# Patient Record
Sex: FEMALE | Race: WHITE | NOT HISPANIC OR LATINO | Employment: UNEMPLOYED | URBAN - METROPOLITAN AREA
[De-identification: names, ages, dates, MRNs, and addresses within clinical notes are randomized per-mention and may not be internally consistent; named-entity substitution may affect disease eponyms.]

---

## 2022-06-01 ENCOUNTER — OFFICE VISIT (OUTPATIENT)
Dept: PODIATRY | Facility: CLINIC | Age: 47
End: 2022-06-01
Payer: COMMERCIAL

## 2022-06-01 VITALS
HEART RATE: 71 BPM | SYSTOLIC BLOOD PRESSURE: 124 MMHG | WEIGHT: 231.4 LBS | HEIGHT: 66 IN | DIASTOLIC BLOOD PRESSURE: 84 MMHG | BODY MASS INDEX: 37.19 KG/M2

## 2022-06-01 DIAGNOSIS — M79.671 PAIN OF RIGHT FOOT: ICD-10-CM

## 2022-06-01 DIAGNOSIS — M77.31 CALCANEAL SPUR, RIGHT: ICD-10-CM

## 2022-06-01 DIAGNOSIS — M24.571 EQUINUS CONTRACTURE OF RIGHT ANKLE: ICD-10-CM

## 2022-06-01 DIAGNOSIS — M72.2 PLANTAR FASCIITIS: Primary | ICD-10-CM

## 2022-06-01 PROCEDURE — 99203 OFFICE O/P NEW LOW 30 MIN: CPT | Performed by: PODIATRIST

## 2022-06-01 PROCEDURE — 20550 NJX 1 TENDON SHEATH/LIGAMENT: CPT | Performed by: PODIATRIST

## 2022-06-01 RX ORDER — MELOXICAM 15 MG/1
15 TABLET ORAL DAILY
COMMUNITY
Start: 2022-05-11 | End: 2022-06-01 | Stop reason: ALTCHOICE

## 2022-06-01 RX ORDER — TRIAMCINOLONE ACETONIDE 40 MG/ML
20 INJECTION, SUSPENSION INTRA-ARTICULAR; INTRAMUSCULAR ONCE
Status: COMPLETED | OUTPATIENT
Start: 2022-06-01 | End: 2022-06-01

## 2022-06-01 RX ORDER — MELOXICAM 15 MG/1
15 TABLET ORAL DAILY
Qty: 30 TABLET | Refills: 0 | Status: SHIPPED | OUTPATIENT
Start: 2022-06-01 | End: 2022-06-27 | Stop reason: ALTCHOICE

## 2022-06-01 RX ORDER — HYDROXYZINE HYDROCHLORIDE 10 MG/1
10 TABLET, FILM COATED ORAL 3 TIMES DAILY PRN
COMMUNITY
Start: 2022-05-06

## 2022-06-01 RX ORDER — SERTRALINE HYDROCHLORIDE 100 MG/1
100 TABLET, FILM COATED ORAL DAILY
COMMUNITY
Start: 2022-05-18

## 2022-06-01 RX ORDER — LIDOCAINE HYDROCHLORIDE 10 MG/ML
2 INJECTION, SOLUTION EPIDURAL; INFILTRATION; INTRACAUDAL; PERINEURAL ONCE
Status: COMPLETED | OUTPATIENT
Start: 2022-06-01 | End: 2022-06-01

## 2022-06-01 RX ORDER — SPIRONOLACTONE 100 MG/1
100 TABLET, FILM COATED ORAL DAILY
COMMUNITY
Start: 2022-05-24

## 2022-06-01 RX ORDER — GUSELKUMAB 100 MG/ML
INJECTION SUBCUTANEOUS
COMMUNITY
Start: 2022-05-18

## 2022-06-01 RX ADMIN — LIDOCAINE HYDROCHLORIDE 2 ML: 10 INJECTION, SOLUTION EPIDURAL; INFILTRATION; INTRACAUDAL; PERINEURAL at 09:23

## 2022-06-01 RX ADMIN — TRIAMCINOLONE ACETONIDE 20 MG: 40 INJECTION, SUSPENSION INTRA-ARTICULAR; INTRAMUSCULAR at 09:23

## 2022-06-01 NOTE — LETTER
June 1, 2022     VICENTA Hawthorne  [address] [place] , [facility], [place]  [address]     Patient: Jose Santos   YOB: 1975   Date of Visit: 6/1/2022       Dear Dr Good:    Thank you for referring Jose Santos to me for evaluation  Below are my notes for this consultation  If you have questions, please do not hesitate to call me  I look forward to following your patient along with you  Sincerely,        Shama Lambert DPM        CC: No Recipients  Kyrie Sutton  6/1/2022  9:41 AM  Sign when Signing Visit                 PATIENT:  Jose Santos  1975         ASSESSMENT:     1  Plantar fasciitis  lidocaine (PF) (XYLOCAINE-MPF) 1 % injection 2 mL    triamcinolone acetonide (KENALOG-40) 40 mg/mL injection 20 mg    meloxicam (MOBIC) 15 mg tablet    Foot injection   2  Calcaneal spur, right     3  Equinus contracture of right ankle     4  Pain of right foot               PLAN:  1  Reviewed medical records  Patient was counseled and educated on the condition and the diagnosis  2  The diagnosis, treatment options and prognosis were discussed with the patient  3  Her symptoms and exam are consistent with plantar fasciitis  4  Treatment options were discussed and the patient wished to proceed with an injection  Injection was given as in the procedure  5  Instructed supportive care, home exercise, icing, and proper footwear/ arch support  6  Renewed meloxicam     7  Patient will return in 6 weeks for re-evaluation  Foot injection     Date/Time 6/1/2022 9:38 AM     Performed by  Shama Lambert DPM     Authorized by Shama Lambert DPM      Universal Protocol   Consent: Verbal consent obtained  Risks and benefits: risks, benefits and alternatives were discussed  Consent given by: patient  Time out: Immediately prior to procedure a "time out" was called to verify the correct patient, procedure, equipment, support staff and site/side marked as required    Timeout called at: 6/1/2022 9:38 AM   Patient understanding: patient states understanding of the procedure being performed  Site marked: the operative site was marked  Patient identity confirmed: verbally with patient       Procedure Details   Procedure Notes:   Treatment options were discussed and the patient wished to proceed with an injection  A trigger point injection was given to right heel using 0 5cc Kenolog 40 and 2cc 1% Lidocaine pl  Instructed supportive care, icing, and resting  Patient tolerance: Patient tolerated the procedure well with no immediate complications               Subjective:       HPI  The patient was referred to my office for right heel pain  She had it since January  She was sent for X-ray and it showed spur  The symptoms presents around right plantar heel with sharp sensation  Pain level is about 9 out of 10  The symptoms have been worse since the onset  The patient has more pain in the morning and after sitting for a while  Pain also increases with walking and standing  The patient does not recall any injury, but reported some overuse  The patient tried meloxicam, night splint, and different shoes without relieving the pain  Denied any swelling  No associated numbness or paresthesia  No significant weakness  The following portions of the patient's history were reviewed and updated as appropriate: allergies, current medications, past family history, past medical history, past social history, past surgical history and problem list   All pertinent labs and images were reviewed  Past Medical History  History reviewed  No pertinent past medical history  Past Surgical History  Past Surgical History:   Procedure Laterality Date    WRIST SURGERY Right         Allergies:  Patient has no known allergies      Medications:  Current Outpatient Medications   Medication Sig Dispense Refill    hydrOXYzine HCL (ATARAX) 10 mg tablet Take 10 mg by mouth 3 (three) times a day as needed  meloxicam (MOBIC) 15 mg tablet Take 1 tablet (15 mg total) by mouth daily after meal 30 tablet 0    sertraline (ZOLOFT) 100 mg tablet Take 100 mg by mouth daily      spironolactone (ALDACTONE) 100 mg tablet Take 100 mg by mouth daily With food      Tremfya 100 MG/ML SOSY        No current facility-administered medications for this visit  Social History:  Social History     Socioeconomic History    Marital status: /Civil Union     Spouse name: None    Number of children: None    Years of education: None    Highest education level: None   Occupational History    None   Tobacco Use    Smoking status: Former Smoker    Smokeless tobacco: Never Used   Vaping Use    Vaping Use: Never used   Substance and Sexual Activity    Alcohol use: None    Drug use: None    Sexual activity: None   Other Topics Concern    None   Social History Narrative    None     Social Determinants of Health     Financial Resource Strain: Not on file   Food Insecurity: Not on file   Transportation Needs: Not on file   Physical Activity: Not on file   Stress: Not on file   Social Connections: Not on file   Intimate Partner Violence: Not on file   Housing Stability: Not on file          Review of Systems   Constitutional: Negative for appetite change, chills and fever  HENT: Negative for sore throat  Respiratory: Negative for cough and shortness of breath  Cardiovascular: Negative for chest pain  Gastrointestinal: Negative for diarrhea, nausea and vomiting  Skin: Negative for wound  Allergic/Immunologic: Negative for immunocompromised state  Neurological: Negative for weakness and numbness  Hematological: Negative  Psychiatric/Behavioral: Negative for behavioral problems and confusion  Objective:      /84   Pulse 71   Ht 5' 6" (1 676 m) Comment: verbal  Wt 105 kg (231 lb 6 4 oz)   BMI 37 35 kg/m²          Physical Exam  Vitals reviewed     Constitutional:       General: She is not in acute distress  Appearance: She is obese  She is not toxic-appearing  HENT:      Head: Normocephalic and atraumatic  Eyes:      Extraocular Movements: Extraocular movements intact  Cardiovascular:      Rate and Rhythm: Normal rate and regular rhythm  Pulses: Normal pulses  Dorsalis pedis pulses are 2+ on the right side and 2+ on the left side  Posterior tibial pulses are 2+ on the right side and 2+ on the left side  Pulmonary:      Effort: Pulmonary effort is normal  No respiratory distress  Musculoskeletal:         General: Tenderness present  No swelling or signs of injury  Cervical back: Normal range of motion and neck supple  Right lower leg: No edema  Left lower leg: No edema  Right foot: No foot drop  Left foot: No foot drop  Comments: Focal pain right plantar heel  No pain with lateral compression of right heel  Tight achilles/ calf with ankle equinus right  No Tinel sign  Skin:     General: Skin is warm  Capillary Refill: Capillary refill takes less than 2 seconds  Coloration: Skin is not cyanotic or mottled  Findings: No abscess, ecchymosis, erythema, rash or wound  Nails: There is no clubbing  Neurological:      General: No focal deficit present  Mental Status: She is alert and oriented to person, place, and time  Cranial Nerves: No cranial nerve deficit  Sensory: No sensory deficit  Motor: No weakness  Coordination: Coordination normal    Psychiatric:         Mood and Affect: Mood normal          Behavior: Behavior normal          Thought Content:  Thought content normal          Judgment: Judgment normal

## 2022-06-01 NOTE — PROGRESS NOTES
PATIENT:  Pastora Wilson  1975         ASSESSMENT:     1  Plantar fasciitis  lidocaine (PF) (XYLOCAINE-MPF) 1 % injection 2 mL    triamcinolone acetonide (KENALOG-40) 40 mg/mL injection 20 mg    meloxicam (MOBIC) 15 mg tablet    Foot injection   2  Calcaneal spur, right     3  Equinus contracture of right ankle     4  Pain of right foot               PLAN:  1  Reviewed medical records  Patient was counseled and educated on the condition and the diagnosis  2  The diagnosis, treatment options and prognosis were discussed with the patient  3  Her symptoms and exam are consistent with plantar fasciitis  4  Treatment options were discussed and the patient wished to proceed with an injection  Injection was given as in the procedure  5  Instructed supportive care, home exercise, icing, and proper footwear/ arch support  6  Renewed meloxicam     7  Patient will return in 6 weeks for re-evaluation  Foot injection     Date/Time 6/1/2022 9:38 AM     Performed by  Kortney Casillas DPM     Authorized by Kortney Casillas DPM      Universal Protocol   Consent: Verbal consent obtained  Risks and benefits: risks, benefits and alternatives were discussed  Consent given by: patient  Time out: Immediately prior to procedure a "time out" was called to verify the correct patient, procedure, equipment, support staff and site/side marked as required  Timeout called at: 6/1/2022 9:38 AM   Patient understanding: patient states understanding of the procedure being performed  Site marked: the operative site was marked  Patient identity confirmed: verbally with patient       Procedure Details   Procedure Notes:   Treatment options were discussed and the patient wished to proceed with an injection  A trigger point injection was given to right heel using 0 5cc Kenolog 40 and 2cc 1% Lidocaine pl  Instructed supportive care, icing, and resting     Patient tolerance: Patient tolerated the procedure well with no immediate complications               Subjective:       HPI  The patient was referred to my office for right heel pain  She had it since January  She was sent for X-ray and it showed spur  The symptoms presents around right plantar heel with sharp sensation  Pain level is about 9 out of 10  The symptoms have been worse since the onset  The patient has more pain in the morning and after sitting for a while  Pain also increases with walking and standing  The patient does not recall any injury, but reported some overuse  The patient tried meloxicam, night splint, and different shoes without relieving the pain  Denied any swelling  No associated numbness or paresthesia  No significant weakness  The following portions of the patient's history were reviewed and updated as appropriate: allergies, current medications, past family history, past medical history, past social history, past surgical history and problem list   All pertinent labs and images were reviewed  Past Medical History  History reviewed  No pertinent past medical history  Past Surgical History  Past Surgical History:   Procedure Laterality Date    WRIST SURGERY Right         Allergies:  Patient has no known allergies  Medications:  Current Outpatient Medications   Medication Sig Dispense Refill    hydrOXYzine HCL (ATARAX) 10 mg tablet Take 10 mg by mouth 3 (three) times a day as needed      meloxicam (MOBIC) 15 mg tablet Take 1 tablet (15 mg total) by mouth daily after meal 30 tablet 0    sertraline (ZOLOFT) 100 mg tablet Take 100 mg by mouth daily      spironolactone (ALDACTONE) 100 mg tablet Take 100 mg by mouth daily With food      Tremfya 100 MG/ML SOSY        No current facility-administered medications for this visit         Social History:  Social History     Socioeconomic History    Marital status: /Civil Union     Spouse name: None    Number of children: None    Years of education: None    Highest education level: None   Occupational History    None   Tobacco Use    Smoking status: Former Smoker    Smokeless tobacco: Never Used   Vaping Use    Vaping Use: Never used   Substance and Sexual Activity    Alcohol use: None    Drug use: None    Sexual activity: None   Other Topics Concern    None   Social History Narrative    None     Social Determinants of Health     Financial Resource Strain: Not on file   Food Insecurity: Not on file   Transportation Needs: Not on file   Physical Activity: Not on file   Stress: Not on file   Social Connections: Not on file   Intimate Partner Violence: Not on file   Housing Stability: Not on file          Review of Systems   Constitutional: Negative for appetite change, chills and fever  HENT: Negative for sore throat  Respiratory: Negative for cough and shortness of breath  Cardiovascular: Negative for chest pain  Gastrointestinal: Negative for diarrhea, nausea and vomiting  Skin: Negative for wound  Allergic/Immunologic: Negative for immunocompromised state  Neurological: Negative for weakness and numbness  Hematological: Negative  Psychiatric/Behavioral: Negative for behavioral problems and confusion  Objective:      /84   Pulse 71   Ht 5' 6" (1 676 m) Comment: verbal  Wt 105 kg (231 lb 6 4 oz)   BMI 37 35 kg/m²          Physical Exam  Vitals reviewed  Constitutional:       General: She is not in acute distress  Appearance: She is obese  She is not toxic-appearing  HENT:      Head: Normocephalic and atraumatic  Eyes:      Extraocular Movements: Extraocular movements intact  Cardiovascular:      Rate and Rhythm: Normal rate and regular rhythm  Pulses: Normal pulses  Dorsalis pedis pulses are 2+ on the right side and 2+ on the left side  Posterior tibial pulses are 2+ on the right side and 2+ on the left side  Pulmonary:      Effort: Pulmonary effort is normal  No respiratory distress  Musculoskeletal:         General: Tenderness present  No swelling or signs of injury  Cervical back: Normal range of motion and neck supple  Right lower leg: No edema  Left lower leg: No edema  Right foot: No foot drop  Left foot: No foot drop  Comments: Focal pain right plantar heel  No pain with lateral compression of right heel  Tight achilles/ calf with ankle equinus right  No Tinel sign  Skin:     General: Skin is warm  Capillary Refill: Capillary refill takes less than 2 seconds  Coloration: Skin is not cyanotic or mottled  Findings: No abscess, ecchymosis, erythema, rash or wound  Nails: There is no clubbing  Neurological:      General: No focal deficit present  Mental Status: She is alert and oriented to person, place, and time  Cranial Nerves: No cranial nerve deficit  Sensory: No sensory deficit  Motor: No weakness  Coordination: Coordination normal    Psychiatric:         Mood and Affect: Mood normal          Behavior: Behavior normal          Thought Content:  Thought content normal          Judgment: Judgment normal

## 2022-06-24 ENCOUNTER — TELEPHONE (OUTPATIENT)
Dept: PODIATRY | Facility: CLINIC | Age: 47
End: 2022-06-24

## 2022-06-24 NOTE — TELEPHONE ENCOUNTER
Vane Lee had a cortisone injection for her plantar fascitis on 6/1/22  Heel is now fine but now the pain shifted to the outer right side of her right foot  She was prescribed Meloxicam, one a day for 10 days  She has been taking it twice a day for the past week as the pain is so bad  Nothing is helping  Please advise

## 2022-06-27 ENCOUNTER — TELEPHONE (OUTPATIENT)
Dept: PODIATRY | Facility: CLINIC | Age: 47
End: 2022-06-27

## 2022-06-27 DIAGNOSIS — M72.2 PLANTAR FASCIITIS: Primary | ICD-10-CM

## 2022-06-27 DIAGNOSIS — M77.31 CALCANEAL SPUR, RIGHT: ICD-10-CM

## 2022-06-27 RX ORDER — IBUPROFEN 600 MG/1
600 TABLET ORAL EVERY 8 HOURS PRN
Qty: 90 TABLET | Refills: 0 | Status: SHIPPED | OUTPATIENT
Start: 2022-06-27 | End: 2022-07-25

## 2022-06-27 NOTE — TELEPHONE ENCOUNTER
I spoke with Jenny Pean  She is stopping the Meloxicam  She needs a Rx for Ibuprofen sent over to her pharmacy  She would also like an order to be put in for the cam boot  If someone cancels for 6/29/22, I will bring her in sooner

## 2022-06-27 NOTE — TELEPHONE ENCOUNTER
Shalonda Bui, DPM  [address] 3 days ago         She is not allowed to take meloxicam twice a day    D/C meloxicam and I can send Ibuprofen which she can take three times a day   She should stay off of her feet and try to schedule her sooner than her appointment  Jean-Paul Hence am out of the office, and can order CAM walker on Monday if needed   Stop meloxicam and I will send her prescription strength Ibuprofen       Message text

## 2022-06-27 NOTE — TELEPHONE ENCOUNTER
Nova Mattson 23 minutes ago (12:44 PM)     MW       I spoke with [facility]  She is stopping the Meloxicam  She needs a Rx for Ibuprofen sent over to her pharmacy  She will hold off on the cam boot until she sees Dr Denis Simpson at her 7/6/22 appointment  If someone cancels for 6/29/22, I will bring her in sooner

## 2022-06-28 NOTE — TELEPHONE ENCOUNTER
Called pt left a message letting her know that rx was sent, and I will be mailing her rx for camboot with a list of locations she can go to for it unless she wants to wait for her appt next week

## 2022-07-06 ENCOUNTER — OFFICE VISIT (OUTPATIENT)
Dept: PODIATRY | Facility: CLINIC | Age: 47
End: 2022-07-06
Payer: COMMERCIAL

## 2022-07-06 ENCOUNTER — APPOINTMENT (OUTPATIENT)
Dept: RADIOLOGY | Facility: CLINIC | Age: 47
End: 2022-07-06
Payer: COMMERCIAL

## 2022-07-06 VITALS
DIASTOLIC BLOOD PRESSURE: 85 MMHG | BODY MASS INDEX: 36.8 KG/M2 | HEIGHT: 66 IN | HEART RATE: 63 BPM | WEIGHT: 229 LBS | SYSTOLIC BLOOD PRESSURE: 121 MMHG

## 2022-07-06 DIAGNOSIS — M84.374A STRESS FRACTURE, RIGHT FOOT, INITIAL ENCOUNTER FOR FRACTURE: ICD-10-CM

## 2022-07-06 DIAGNOSIS — M79.671 PAIN OF RIGHT FOOT: ICD-10-CM

## 2022-07-06 DIAGNOSIS — M72.2 PLANTAR FASCIITIS: Primary | ICD-10-CM

## 2022-07-06 DIAGNOSIS — M77.31 CALCANEAL SPUR, RIGHT: ICD-10-CM

## 2022-07-06 PROCEDURE — 73630 X-RAY EXAM OF FOOT: CPT

## 2022-07-06 PROCEDURE — 99213 OFFICE O/P EST LOW 20 MIN: CPT | Performed by: PODIATRIST

## 2022-07-06 NOTE — PROGRESS NOTES
PATIENT:  Phillip Pratt  1975         ASSESSMENT:     1  Plantar fasciitis     2  Calcaneal spur, right     3  Pain of right foot  XR foot 3+ vw right   4  Stress fracture, right foot, initial encounter for fracture  Cam Boot             PLAN:  1  Reviewed medical records  Patient was counseled and educated on the condition and the diagnosis  2  X-ray was obtained and reviewed with her  The diagnosis, treatment options and prognosis were discussed with the patient  3  Right heel pain resolved, but she has pain around 4th/5th met right foot  Start her on CAM walker for possible stress fracture  4   Resting, elevation, and icing  5  Possible further images depending on the progress  6  Patient will return in 3 weeks for re-evaluation  Subjective:     HPI  The patient presents for follow-up on right heel pain  Heel pain is minimal after the injection  She notices plantar lateral foot pain on right side after the last visit  She denied injury  Pain with walking and standing  No associated numbness or paresthesia  The following portions of the patient's history were reviewed and updated as appropriate: allergies, current medications, past family history, past medical history, past social history, past surgical history and problem list   All pertinent labs and images were reviewed  Past Medical History  History reviewed  No pertinent past medical history  Past Surgical History  Past Surgical History:   Procedure Laterality Date    WRIST SURGERY Right         Allergies:  Patient has no known allergies      Medications:  Current Outpatient Medications   Medication Sig Dispense Refill    hydrOXYzine HCL (ATARAX) 10 mg tablet Take 10 mg by mouth 3 (three) times a day as needed      ibuprofen (MOTRIN) 600 mg tablet Take 1 tablet (600 mg total) by mouth every 8 (eight) hours as needed for mild pain or moderate pain (Take after meal) 90 tablet 0    sertraline (ZOLOFT) 100 mg tablet Take 100 mg by mouth daily      spironolactone (ALDACTONE) 100 mg tablet Take 100 mg by mouth daily With food      Tremfya 100 MG/ML SOSY        No current facility-administered medications for this visit  Social History:  Social History     Socioeconomic History    Marital status: /Civil Union     Spouse name: None    Number of children: None    Years of education: None    Highest education level: None   Occupational History    None   Tobacco Use    Smoking status: Former Smoker    Smokeless tobacco: Never Used   Vaping Use    Vaping Use: Never used   Substance and Sexual Activity    Alcohol use: None    Drug use: None    Sexual activity: None   Other Topics Concern    None   Social History Narrative    None     Social Determinants of Health     Financial Resource Strain: Not on file   Food Insecurity: Not on file   Transportation Needs: Not on file   Physical Activity: Not on file   Stress: Not on file   Social Connections: Not on file   Intimate Partner Violence: Not on file   Housing Stability: Not on file          Review of Systems   Constitutional: Negative for appetite change, chills and fever  Respiratory: Negative for cough and shortness of breath  Cardiovascular: Negative for chest pain  Gastrointestinal: Negative for diarrhea, nausea and vomiting  Neurological: Negative for weakness and numbness  Objective:      /85 (BP Location: Left arm, Patient Position: Sitting, Cuff Size: Adult)   Pulse 63   Ht 5' 6" (1 676 m) Comment: verbal  Wt 104 kg (229 lb)   BMI 36 96 kg/m²          Physical Exam  Vitals reviewed  Constitutional:       General: She is not in acute distress  Appearance: She is obese  She is not toxic-appearing  Cardiovascular:      Rate and Rhythm: Normal rate and regular rhythm  Pulses: Normal pulses  Dorsalis pedis pulses are 2+ on the right side and 2+ on the left side          Posterior tibial pulses are 2+ on the right side and 2+ on the left side  Pulmonary:      Effort: Pulmonary effort is normal  No respiratory distress  Musculoskeletal:         General: Tenderness present  No signs of injury  Right lower leg: No edema  Left lower leg: No edema  Right foot: No foot drop  Left foot: No foot drop  Comments: No pain right plantar heel  No pain with lateral compression of right heel  Tight achilles/ calf with ankle equinus right  No Tinel sign  Tenderness noted right 4th/5th metatarsal plantarly  Skin:     General: Skin is warm  Capillary Refill: Capillary refill takes less than 2 seconds  Coloration: Skin is not cyanotic or mottled  Findings: No abscess, ecchymosis, erythema, rash or wound  Nails: There is no clubbing  Neurological:      General: No focal deficit present  Mental Status: She is alert and oriented to person, place, and time  Cranial Nerves: No cranial nerve deficit  Sensory: No sensory deficit  Motor: No weakness  Coordination: Coordination normal    Psychiatric:         Mood and Affect: Mood normal          Behavior: Behavior normal          Thought Content:  Thought content normal          Judgment: Judgment normal

## 2022-07-24 DIAGNOSIS — M72.2 PLANTAR FASCIITIS: ICD-10-CM

## 2022-07-24 DIAGNOSIS — M77.31 CALCANEAL SPUR, RIGHT: ICD-10-CM

## 2022-07-25 RX ORDER — IBUPROFEN 600 MG/1
TABLET ORAL
Qty: 90 TABLET | Refills: 0 | Status: SHIPPED | OUTPATIENT
Start: 2022-07-25

## 2023-08-04 ENCOUNTER — OFFICE VISIT (OUTPATIENT)
Dept: OBGYN CLINIC | Facility: OTHER | Age: 48
End: 2023-08-04
Payer: COMMERCIAL

## 2023-08-04 ENCOUNTER — APPOINTMENT (OUTPATIENT)
Dept: RADIOLOGY | Facility: OTHER | Age: 48
End: 2023-08-04
Payer: COMMERCIAL

## 2023-08-04 VITALS
WEIGHT: 244 LBS | SYSTOLIC BLOOD PRESSURE: 132 MMHG | HEART RATE: 75 BPM | HEIGHT: 66 IN | BODY MASS INDEX: 39.21 KG/M2 | DIASTOLIC BLOOD PRESSURE: 90 MMHG

## 2023-08-04 DIAGNOSIS — S89.91XA INJURY OF RIGHT KNEE, INITIAL ENCOUNTER: Primary | ICD-10-CM

## 2023-08-04 DIAGNOSIS — M25.561 ACUTE PAIN OF RIGHT KNEE: ICD-10-CM

## 2023-08-04 PROCEDURE — 73562 X-RAY EXAM OF KNEE 3: CPT

## 2023-08-04 PROCEDURE — 99204 OFFICE O/P NEW MOD 45 MIN: CPT | Performed by: FAMILY MEDICINE

## 2023-08-04 RX ORDER — NAPROXEN 500 MG/1
500 TABLET ORAL 2 TIMES DAILY PRN
Qty: 40 TABLET | Refills: 0 | Status: SHIPPED | OUTPATIENT
Start: 2023-08-04

## 2023-08-04 RX ORDER — RISANKIZUMAB-RZAA 150 MG/ML
INJECTION SUBCUTANEOUS
COMMUNITY
Start: 2023-06-20

## 2023-08-04 NOTE — PATIENT INSTRUCTIONS
Educated risks of mixing NSAIDS ( (non-steroidal anti-inflammatory pills including advil, ibuprofen, motrin, meloxicam, celecoxib, aleve, naproxen, and aspirin containing products) with each other or with steroids (such as prednisone, medrol). Explained risks of mixing these medications including stomach ulcer, severe internal bleeding, and kidney failure. Instructed not to take NSAIDS if have history of stomach ulcers, kidney issues, or uncontrolled hypertension. Instructed patient to use only one brand as prescribed. For naproxen, a maximum of 500 mg per dose every 12 hours and no more than two doses or 1,000mg per day. For Ibuprofen, a maximum of 800 mg per dose every 6 hours but no more than 3 doses or 2,400 mg per day. Never take these medications together. Never take these medications the same day. For severe pain and only if you have no liver problems, you may add Tylenol (also known as acetaminophen) maximum of 1,000  Mg per dose every 6 hours but no more 3 doses or 3,000 mg per day. Patient expressed understanding and agreed to plan.

## 2023-08-04 NOTE — PROGRESS NOTES
1. Injury of right knee, initial encounter  MRI knee right  wo contrast    naproxen (NAPROSYN) 500 mg tablet    Brace    CANCELED: XR knee 4+ vw right injury        Orders Placed This Encounter   Procedures   • Brace   • MRI knee right  wo contrast        IMAGING STUDIES: (I personally reviewed images in PACS and report):  Xray right knee 8/4/23:  No acute osseous abnormality       PAST REPORTS:        ASSESSMENT/PLAN:  Right Knee Injury    DDX  Meniscal tear    Repeat X-ray next visit: None    Return for Follow-up after MRI is completed for review. Patient instructions below verbally summarized in person during encounter:  Patient Instructions   Educated risks of mixing NSAIDS ( (non-steroidal anti-inflammatory pills including advil, ibuprofen, motrin, meloxicam, celecoxib, aleve, naproxen, and aspirin containing products) with each other or with steroids (such as prednisone, medrol). Explained risks of mixing these medications including stomach ulcer, severe internal bleeding, and kidney failure. Instructed not to take NSAIDS if have history of stomach ulcers, kidney issues, or uncontrolled hypertension. Instructed patient to use only one brand as prescribed. For naproxen, a maximum of 500 mg per dose every 12 hours and no more than two doses or 1,000mg per day. For Ibuprofen, a maximum of 800 mg per dose every 6 hours but no more than 3 doses or 2,400 mg per day. Never take these medications together. Never take these medications the same day. For severe pain and only if you have no liver problems, you may add Tylenol (also known as acetaminophen) maximum of 1,000  Mg per dose every 6 hours but no more 3 doses or 3,000 mg per day. Patient expressed understanding and agreed to plan.            __________________________________________________________________________    HISTORY OF PRESENT ILLNESS:  Evaluation of right knee injury was occurred on 8/2/2023 when patient stepped off a bed and twisted her knee.   She had immediate pain and describes difficulty bending her knee. Associate symptoms do include swelling. Describes pain as intermittent stabbing stiffness exacerbated by climbing stairs as well as squatting. She has had no other evaluation at this time. No previous medical history of issues with her knee. Denies any previous history of arthritis. Review of Systems      Following history reviewed and update:    No past medical history on file. Past Surgical History:   Procedure Laterality Date   • WRIST SURGERY Right      Social History   Social History     Substance and Sexual Activity   Alcohol Use None     Social History     Substance and Sexual Activity   Drug Use Not on file     Social History     Tobacco Use   Smoking Status Former   Smokeless Tobacco Never     No family history on file. No Known Allergies       Physical Exam  /90 (BP Location: Right arm, Patient Position: Sitting, Cuff Size: Adult)   Pulse 75   Ht 5' 6" (1.676 m)   Wt 111 kg (244 lb)   BMI 39.38 kg/m²     Constitutional:  see vital signs  Gen: well-developed, normocephalic/atraumatic, well-groomed  Eyes: No inflammation or discharge of conjunctiva or lids; sclera clear   Pharynx: no inflammation, lesion, or mass of lips  Neck: supple, no masses, non-distended  MSK: no inflammation, lesion, mass, or clubbing of nails and digits except for other than mentioned below  SKIN: no visible rashes or skin lesions  Pulmonary/Chest: Effort normal. No respiratory distress.    NEURO: cranial nerves grossly intact  PSYCH:  Alert and oriented to person, place, and time; recent and remote memory intact; mood normal, no depression, anxiety, or agitation, judgment and insight good and intact     Ortho Exam  RIGHT KNEE:  Erythema: no  Swelling: no  Increased Warmth: no  Tenderness: +3  Flexion: intact  Extension: intact  Patellar Displacement:  Patellar Tilt:  Patellar Apprehension: negative  Lachman's: negative  Drawer: negative  Varus laxity: negative  Valgus laxity: negative  [facility]: +medial and lateral    __________________________________________________________________________  Procedures

## 2023-08-04 NOTE — LETTER
August 4, 2023     Patient: Felipe Alva  YOB: 1975  Date of Visit: 8/4/2023      To Whom it May Concern:    Felipe Alva is under my professional care. Mariluz was seen in my office on 8/4/2023. Mariluz may return to work on 8/7/23 with seated work from home only . Patient to be reevaluated on or about 8/21/23. If you have any questions or concerns, please don't hesitate to call.          Sincerely,          Lenin Reyes III, DO        CC: No Recipients

## 2023-08-14 ENCOUNTER — HOSPITAL ENCOUNTER (OUTPATIENT)
Dept: MRI IMAGING | Facility: HOSPITAL | Age: 48
Discharge: HOME | End: 2023-08-14
Attending: FAMILY MEDICINE
Payer: COMMERCIAL

## 2023-08-14 DIAGNOSIS — S89.91XA INJURY OF RIGHT KNEE, INITIAL ENCOUNTER: ICD-10-CM

## 2023-08-14 PROCEDURE — G1004 CDSM NDSC: HCPCS

## 2023-08-14 PROCEDURE — 73721 MRI JNT OF LWR EXTRE W/O DYE: CPT

## 2023-08-16 ENCOUNTER — OFFICE VISIT (OUTPATIENT)
Dept: OBGYN CLINIC | Facility: CLINIC | Age: 48
End: 2023-08-16
Payer: COMMERCIAL

## 2023-08-16 VITALS
DIASTOLIC BLOOD PRESSURE: 82 MMHG | BODY MASS INDEX: 42.7 KG/M2 | SYSTOLIC BLOOD PRESSURE: 134 MMHG | WEIGHT: 241 LBS | HEIGHT: 63 IN

## 2023-08-16 DIAGNOSIS — S89.91XA INJURY OF RIGHT KNEE, INITIAL ENCOUNTER: Primary | ICD-10-CM

## 2023-08-16 PROCEDURE — 99213 OFFICE O/P EST LOW 20 MIN: CPT | Performed by: FAMILY MEDICINE

## 2023-08-16 NOTE — PROGRESS NOTES
1. Injury of right knee, initial encounter  Ambulatory Referral to Orthopedic Surgery        Orders Placed This Encounter   Procedures   • Ambulatory Referral to Orthopedic Surgery        IMAGING STUDIES: (I personally reviewed images in PACS and report): MRI Right Knee 8/17/23:  1. High T2 signal throughout the ACL with irregular appearance to ACL fibers concerning for high-grade partial versus complete proximal ACL tear, chronic in the absence of bone contusions. 2. Signal abnormality throughout the lateral meniscus with parameniscal cyst formation identified laterally concerning for underlying occult nondisplaced tear. Medial meniscus remains intact. 3. Grade II chondromalacia patella with associated small joint effusion. PAST REPORTS:        ASSESSMENT/PLAN:  Possible Chronic ACL Tear    Repeat X-ray next visit: None    Return for Follow-up with Surgeon. Patient instructions below verbally summarized in person during encounter:  Patient Instructions   I explained the patient that even though she is 90% improved overall she has some findings on her MRI concerning for possible ACL tear. On examination she does appear to be stable on Lachman's as well as drawer test and she could potentially have some mucoid degenerative change of her ACL mimicking a tear on MRI however I would like her to meet with the orthopedic surgeon to have a second opinion. I explained that not repairing ACL if torn could lead to significant arthritis in the future. Patient expressed understanding and agreed to plan.        __________________________________________________________________________    HISTORY OF PRESENT ILLNESS:  F/u right knee pain. Improved since last visit. Pain now mild. Mri reveals possible chronic acl tear. Review of Systems      Following history reviewed and update:    History reviewed. No pertinent past medical history.   Past Surgical History:   Procedure Laterality Date   • WRIST SURGERY Right      Social History   Social History     Substance and Sexual Activity   Alcohol Use None     Social History     Substance and Sexual Activity   Drug Use Not on file     Social History     Tobacco Use   Smoking Status Former   Smokeless Tobacco Never     History reviewed. No pertinent family history. No Known Allergies       Physical Exam  /82   Ht 5' 3" (1.6 m)   Wt 109 kg (241 lb)   BMI 42.69 kg/m²     Constitutional:  see vital signs  Gen: well-developed, normocephalic/atraumatic, well-groomed  Eyes: No inflammation or discharge of conjunctiva or lids; sclera clear   Pharynx: no inflammation, lesion, or mass of lips  Neck: supple, no masses, non-distended  MSK: no inflammation, lesion, mass, or clubbing of nails and digits except for other than mentioned below  SKIN: no visible rashes or skin lesions  Pulmonary/Chest: Effort normal. No respiratory distress.    NEURO: cranial nerves grossly intact  PSYCH:  Alert and oriented to person, place, and time; recent and remote memory intact; mood normal, no depression, anxiety, or agitation, judgment and insight good and intact     Ortho Exam  RIGHT KNEE:  Erythema: no  Swelling: no  Increased Warmth: no  Extension: intact  Lachman's:+ acl laxity  Varus laxity: negative  Valgus laxity: negative            __________________________________________________________________________  Procedures

## 2023-08-16 NOTE — PATIENT INSTRUCTIONS
I explained the patient that even though she is 90% improved overall she has some findings on her MRI concerning for possible ACL tear. On examination she does appear to be stable on Lachman's as well as drawer test and she could potentially have some mucoid degenerative change of her ACL mimicking a tear on MRI however I would like her to meet with the orthopedic surgeon to have a second opinion. I explained that not repairing ACL if torn could lead to significant arthritis in the future. Patient expressed understanding and agreed to plan.

## 2023-08-24 ENCOUNTER — OFFICE VISIT (OUTPATIENT)
Dept: OBGYN CLINIC | Facility: CLINIC | Age: 48
End: 2023-08-24
Payer: COMMERCIAL

## 2023-08-24 VITALS
SYSTOLIC BLOOD PRESSURE: 154 MMHG | RESPIRATION RATE: 18 BRPM | HEIGHT: 63 IN | BODY MASS INDEX: 42.7 KG/M2 | DIASTOLIC BLOOD PRESSURE: 90 MMHG | WEIGHT: 241 LBS | HEART RATE: 75 BPM

## 2023-08-24 DIAGNOSIS — S89.91XA INJURY OF RIGHT KNEE, INITIAL ENCOUNTER: ICD-10-CM

## 2023-08-24 PROCEDURE — 99214 OFFICE O/P EST MOD 30 MIN: CPT | Performed by: STUDENT IN AN ORGANIZED HEALTH CARE EDUCATION/TRAINING PROGRAM

## 2023-08-24 RX ORDER — TRIAMCINOLONE ACETONIDE 1 MG/G
CREAM TOPICAL
COMMUNITY
Start: 2023-08-20

## 2023-08-24 NOTE — PROGRESS NOTES
Ortho Sports Medicine Knee New Patient Visit     Assesment:   57 y.o. female right knee lateral meniscus tear and chronic ACL tear without instability    Plan:  The patient's diagnosis and treatment were discussed at length today. We discussed no treatment, non-operative treatment, and operative treatment. Patient's finding and exam are consistent with right knee lateral meniscus and chronic ACL tear. Given that she denies any pain or discomfort as well as mechanical symptoms and/or instability, I discussed with her this can be treated nonoperatively with a combination of bracing and physical therapy. She states that she already has a brace at today's visit and I emphasized the importance of wearing it for increased comfort and stability. I did provide her with a referral to outpatient physical therapy for hip and thigh strengthening range of motion exercises. I discussed with her that if at any point time she begins exhibiting instability episodes and mechanical symptoms to follow-up sooner and we can further discuss possible surgical interventions of a right knee arthroscopy. She can continue to form activity as tolerated using pain as a guide. She can continue use ice, heat, over-the-counter medication as needed for pain relief. She is to follow-up in approximately 3 months for reevaluation. Conservative treatment:    Ice to knee for 20 minutes at least 1-2 times daily. PT for ROM/strengthening to knee, hip and core. Let pain guide gradual return activities. Continue use of knee brace for comfort and stability. Imaging: All imaging from today was reviewed by myself and explained to the patient. Injection:    No Injection planned at this time. Surgery:     No surgery is recommended at this point, continue with conservative treatment plan as noted. Follow up:    Return in about 3 months (around 11/24/2023) for Recheck.         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 Celester Cabot, seen in clinic for consultation of right knee pain. She states several weeks ago she was walking around and twisted her knee getting off of bed. She states that after this injury she had medial and anterior knee pain which she describes as dull and achy and rated a 6 out of 10 with some swelling. She states that she was later seen by Dr. Celester Cabot who obtained an MRI that demonstrated ACL and lateral meniscus tear. She states that he did provide her with a short hinged knee brace that she has been wearing for comfort and stability. She states since the injury her pain and swelling has improved significantly and denies any pain or discomfort at this point time during today's visit. She denies any instability episodes. She denies any mechanical symptoms of catching or locking. She states that she only has pain with certain twisting movements, however the pain is manageable. She has not attempted any cortisone injection or outpatient physical therapy. She is attempted to manage her pain with ice, bracing, rest, and over-the-counter medication. She denies any previous history of injury or trauma to her knee. She denies any numbness or tingling. Knee Surgical History:  None    Past Medical, Social and Family History:  History reviewed. No pertinent past medical history.   Past Surgical History:   Procedure Laterality Date   • WRIST SURGERY Right      No Known Allergies  Current Outpatient Medications on File Prior to Visit   Medication Sig Dispense Refill   • hydrOXYzine HCL (ATARAX) 10 mg tablet Take 10 mg by mouth 3 (three) times a day as needed     • naproxen (NAPROSYN) 500 mg tablet Take 1 tablet (500 mg total) by mouth 2 (two) times a day as needed for moderate pain 40 tablet 0   • sertraline (ZOLOFT) 100 mg tablet Take 100 mg by mouth daily     • Edenilson Pen 150 MG/ML SOAJ      • spironolactone (ALDACTONE) 100 mg tablet Take 100 mg by mouth daily With food     • triamcinolone (KENALOG) 0.1 % cream        No current facility-administered medications on file prior to visit. Social History     Socioeconomic History   • Marital status: /Civil Union     Spouse name: Not on file   • Number of children: Not on file   • Years of education: Not on file   • Highest education level: Not on file   Occupational History   • Not on file   Tobacco Use   • Smoking status: Former   • Smokeless tobacco: Never   Vaping Use   • Vaping Use: Never used   Substance and Sexual Activity   • Alcohol use: Not on file   • Drug use: Not on file   • Sexual activity: Not on file   Other Topics Concern   • Not on file   Social History Narrative   • Not on file     Social Determinants of Health     Financial Resource Strain: Not on file   Food Insecurity: Not on file   Transportation Needs: Not on file   Physical Activity: Not on file   Stress: Not on file   Social Connections: Not on file   Intimate Partner Violence: Not on file   Housing Stability: Not on file         I have reviewed the past medical, surgical, social and family history, medications and allergies as documented in the EMR. Review of systems: ROS is negative other than that noted in the HPI. Constitutional: Negative for fatigue and fever. HENT: Negative for sore throat. Respiratory: Negative for shortness of breath. Cardiovascular: Negative for chest pain. Gastrointestinal: Negative for abdominal pain. Endocrine: Negative for cold intolerance and heat intolerance. Genitourinary: Negative for flank pain. Musculoskeletal: Negative for back pain. Skin: Negative for rash. Allergic/Immunologic: Negative for immunocompromised state. Neurological: Negative for dizziness. Psychiatric/Behavioral: Negative for agitation. Physical Exam:    Blood pressure 154/90, pulse 75, resp.  rate 18, height 5' 3" (1.6 m), weight 109 kg (241 lb). General/Constitutional: NAD, well developed, well nourished  HENT: Normocephalic, atraumatic  CV: Intact distal pulses, regular rate  Resp: No respiratory distress or labored breathing  Lymphatic: No lymphadenopathy palpated  Neuro: Alert and Oriented x 3, no focal deficits  Psych: Normal mood, normal affect, normal judgement, normal behavior  Skin: Warm, dry, no rashes, no erythema      Knee Exam (focused):  Visual inspection of the Right knee demonstrates normal contour without atrophy. No previous incisions   There is no significant erythema or edema. No significant joint effusion   Range of motion is full from 0-130 degrees of flexion   Able to straight leg raise   No patellar tender to palpation  No medial joint line tenderness, Minimal lateral joint line tenderness  - medial Ivonne's, + lateral Ivonne's  1B Lachman exam, Stable posterior drawer  - dial test  Stable to varus and valgus stress at both 0 and 30°  Patella tracks normally. No J sign. No apprehension. Translation is approximately 2 quadrants and is equal to the contralateral side  Patellar eversion is similar to the contralateral side    Examination of the patient's ipsilateral hip demonstrates full painless range of motion. No crepitus. LE NV Exam: +2 DP/PT pulses bilaterally  Sensation intact to light touch L2-S1 bilaterally     Bilateral hip ROM demonstrates no pain actively or passively    No calf tenderness to palpation bilaterally    Knee Imaging    X-rays of the right knee were reviewed, which demonstrate no significant degenerative changes or acute osseous abnormalities. I have reviewed the radiology report and agree with their impression. MRI of the right knee were reviewed, which demonstrate increased signal of anterior cruciate ligament concerning for high-grade partial-thickness versus complete ACL tear likely chronic in nature.   There is also increasing no abnormality in lateral meniscus with parameniscal cyst.  Medial meniscus appears intact. I have reviewed the radiology report and agree with their impression.       Scribe Attestation    I,:  Gege Dumont am acting as a scribe while in the presence of the attending physician.:       I,:  Lashon Marcano DO personally performed the services described in this documentation    as scribed in my presence.:

## 2023-08-24 NOTE — LETTER
August 24, 2023     [address] [place] [address]    Patient: Mely Louis   YOB: 1975   Date of Visit: 8/24/2023       Dear Dr. Blue Ours: Thank you for referring Mely Louis to me for evaluation. Below are my notes for this consultation. If you have questions, please do not hesitate to call me. I look forward to following your patient along with you. Sincerely,        Donato Pichardo DO        CC: No Recipients    Donato Pichardo DO  8/24/2023 12:18 PM  Sign when Signing Visit  Ortho Sports Medicine Knee New Patient Visit     Assesment:   57 y.o. female right knee lateral meniscus tear and chronic ACL tear without instability    Plan:  The patient's diagnosis and treatment were discussed at length today. We discussed no treatment, non-operative treatment, and operative treatment. Patient's finding and exam are consistent with right knee lateral meniscus and chronic ACL tear. Given that she denies any pain or discomfort as well as mechanical symptoms and/or instability, I discussed with her this can be treated nonoperatively with a combination of bracing and physical therapy. She states that she already has a brace at today's visit and I emphasized the importance of wearing it for increased comfort and stability. I did provide her with a referral to outpatient physical therapy for hip and thigh strengthening range of motion exercises. I discussed with her that if at any point time she begins exhibiting instability episodes and mechanical symptoms to follow-up sooner and we can further discuss possible surgical interventions of a right knee arthroscopy. She can continue to form activity as tolerated using pain as a guide. She can continue use ice, heat, over-the-counter medication as needed for pain relief. She is to follow-up in approximately 3 months for reevaluation.      Conservative treatment:    Ice to knee for 20 minutes at least 1-2 times daily. PT for ROM/strengthening to knee, hip and core. Let pain guide gradual return activities. Continue use of knee brace for comfort and stability. Imaging: All imaging from today was reviewed by myself and explained to the patient. Injection:    No Injection planned at this time. Surgery:     No surgery is recommended at this point, continue with conservative treatment plan as noted. Follow up:    Return in about 3 months (around 11/24/2023) for Recheck. 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 Heidi Allen, seen in clinic for consultation of right knee pain. She states several weeks ago she was walking around and twisted her knee getting off of bed. She states that after this injury she had medial and anterior knee pain which she describes as dull and achy and rated a 6 out of 10 with some swelling. She states that she was later seen by Dr. Heidi Allen who obtained an MRI that demonstrated ACL and lateral meniscus tear. She states that he did provide her with a short hinged knee brace that she has been wearing for comfort and stability. She states since the injury her pain and swelling has improved significantly and denies any pain or discomfort at this point time during today's visit. She denies any instability episodes. She denies any mechanical symptoms of catching or locking. She states that she only has pain with certain twisting movements, however the pain is manageable. She has not attempted any cortisone injection or outpatient physical therapy. She is attempted to manage her pain with ice, bracing, rest, and over-the-counter medication. She denies any previous history of injury or trauma to her knee. She denies any numbness or tingling.     Knee Surgical History:  None    Past Medical, Social and Family History:  History reviewed. No pertinent past medical history. Past Surgical History:   Procedure Laterality Date   • WRIST SURGERY Right      No Known Allergies  Current Outpatient Medications on File Prior to Visit   Medication Sig Dispense Refill   • hydrOXYzine HCL (ATARAX) 10 mg tablet Take 10 mg by mouth 3 (three) times a day as needed     • naproxen (NAPROSYN) 500 mg tablet Take 1 tablet (500 mg total) by mouth 2 (two) times a day as needed for moderate pain 40 tablet 0   • sertraline (ZOLOFT) 100 mg tablet Take 100 mg by mouth daily     • Skyrizi Pen 150 MG/ML SOAJ      • spironolactone (ALDACTONE) 100 mg tablet Take 100 mg by mouth daily With food     • triamcinolone (KENALOG) 0.1 % cream        No current facility-administered medications on file prior to visit. Social History     Socioeconomic History   • Marital status: /Civil Union     Spouse name: Not on file   • Number of children: Not on file   • Years of education: Not on file   • Highest education level: Not on file   Occupational History   • Not on file   Tobacco Use   • Smoking status: Former   • Smokeless tobacco: Never   Vaping Use   • Vaping Use: Never used   Substance and Sexual Activity   • Alcohol use: Not on file   • Drug use: Not on file   • Sexual activity: Not on file   Other Topics Concern   • Not on file   Social History Narrative   • Not on file     Social Determinants of Health     Financial Resource Strain: Not on file   Food Insecurity: Not on file   Transportation Needs: Not on file   Physical Activity: Not on file   Stress: Not on file   Social Connections: Not on file   Intimate Partner Violence: Not on file   Housing Stability: Not on file         I have reviewed the past medical, surgical, social and family history, medications and allergies as documented in the EMR. Review of systems: ROS is negative other than that noted in the HPI. Constitutional: Negative for fatigue and fever.    HENT: Negative for sore throat. Respiratory: Negative for shortness of breath. Cardiovascular: Negative for chest pain. Gastrointestinal: Negative for abdominal pain. Endocrine: Negative for cold intolerance and heat intolerance. Genitourinary: Negative for flank pain. Musculoskeletal: Negative for back pain. Skin: Negative for rash. Allergic/Immunologic: Negative for immunocompromised state. Neurological: Negative for dizziness. Psychiatric/Behavioral: Negative for agitation. Physical Exam:    Blood pressure 154/90, pulse 75, resp. rate 18, height 5' 3" (1.6 m), weight 109 kg (241 lb). General/Constitutional: NAD, well developed, well nourished  HENT: Normocephalic, atraumatic  CV: Intact distal pulses, regular rate  Resp: No respiratory distress or labored breathing  Lymphatic: No lymphadenopathy palpated  Neuro: Alert and Oriented x 3, no focal deficits  Psych: Normal mood, normal affect, normal judgement, normal behavior  Skin: Warm, dry, no rashes, no erythema      Knee Exam (focused):  Visual inspection of the Right knee demonstrates normal contour without atrophy. No previous incisions   There is no significant erythema or edema. No significant joint effusion   Range of motion is full from 0-130 degrees of flexion   Able to straight leg raise   No patellar tender to palpation  No medial joint line tenderness, Minimal lateral joint line tenderness  - medial Ivonne's, + lateral Ivonne's  1B Lachman exam, Stable posterior drawer  - dial test  Stable to varus and valgus stress at both 0 and 30°  Patella tracks normally. No J sign. No apprehension. Translation is approximately 2 quadrants and is equal to the contralateral side  Patellar eversion is similar to the contralateral side    Examination of the patient's ipsilateral hip demonstrates full painless range of motion. No crepitus.       LE NV Exam: +2 DP/PT pulses bilaterally  Sensation intact to light touch L2-S1 bilaterally     Bilateral hip ROM demonstrates no pain actively or passively    No calf tenderness to palpation bilaterally    Knee Imaging    X-rays of the right knee were reviewed, which demonstrate no significant degenerative changes or acute osseous abnormalities. I have reviewed the radiology report and agree with their impression. MRI of the right knee were reviewed, which demonstrate increased signal of anterior cruciate ligament concerning for high-grade partial-thickness versus complete ACL tear likely chronic in nature. There is also increasing no abnormality in lateral meniscus with parameniscal cyst.  Medial meniscus appears intact. I have reviewed the radiology report and agree with their impression.       Scribe Attestation      I,:  Cornelia Santoyo am acting as a scribe while in the presence of the attending physician.:       I,:  Mindy Lu, DO personally performed the services described in this documentation    as scribed in my presence.:

## 2023-10-17 DIAGNOSIS — S89.91XA INJURY OF RIGHT KNEE, INITIAL ENCOUNTER: ICD-10-CM

## 2023-10-17 RX ORDER — NAPROXEN 500 MG/1
TABLET ORAL
Qty: 40 TABLET | Refills: 0 | Status: SHIPPED | OUTPATIENT
Start: 2023-10-17

## 2024-04-27 ENCOUNTER — OFFICE VISIT (OUTPATIENT)
Dept: OBGYN CLINIC | Facility: CLINIC | Age: 49
End: 2024-04-27
Payer: COMMERCIAL

## 2024-04-27 ENCOUNTER — APPOINTMENT (OUTPATIENT)
Dept: RADIOLOGY | Facility: CLINIC | Age: 49
End: 2024-04-27
Payer: COMMERCIAL

## 2024-04-27 VITALS
WEIGHT: 235 LBS | SYSTOLIC BLOOD PRESSURE: 148 MMHG | DIASTOLIC BLOOD PRESSURE: 98 MMHG | BODY MASS INDEX: 37.77 KG/M2 | HEIGHT: 66 IN

## 2024-04-27 DIAGNOSIS — M25.531 RIGHT WRIST PAIN: ICD-10-CM

## 2024-04-27 DIAGNOSIS — S62.021K CLOSED DISPLACED FRACTURE OF MIDDLE THIRD OF SCAPHOID OF RIGHT WRIST WITH NONUNION, SUBSEQUENT ENCOUNTER: Primary | ICD-10-CM

## 2024-04-27 PROCEDURE — 73110 X-RAY EXAM OF WRIST: CPT

## 2024-04-27 PROCEDURE — 99214 OFFICE O/P EST MOD 30 MIN: CPT | Performed by: FAMILY MEDICINE

## 2024-04-27 NOTE — PATIENT INSTRUCTIONS
I recommended consultation with orthopedic hand surgeon after completion of CT scan of wrist to determine if there is incomplete healing of scaphoid fracture and also for opinion safety of pursuing USG 1st CMC OA CSI in setting of residual orthopedic hardware in wrist. If plan is to continue with conservative treatment then would consider USG CSI 1st CMC OA vs 1st DC.

## 2024-04-27 NOTE — PROGRESS NOTES
1. 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 Right wrist pain  XR wrist 3+ vw right    Ambulatory Referral to Orthopedic Surgery        Orders Placed This Encounter   Procedures    XR wrist 3+ vw right    CT upper extremity wo contrast right    Ambulatory Referral to Orthopedic Surgery        IMAGING STUDIES: (I personally reviewed images in PACS and report):  Xray right wrist 4/27/24:  Right Scaphiod Fracture s/p screw placement with lucency on xray concerning for incomplete union       PAST REPORTS:        ASSESSMENT/PLAN:  Complex Right Wrist Pain s/p previous surgery for scaphoid fracture  Right Wrist 1st CMC and triscaphe OA  Right Scaphiod Fracture s/p screw placement with lucency on xray concerning for incomplete union     Repeat X-ray next visit: None    Return for Follow-up with Surgeon.    Patient instructions below verbally summarized in person during encounter:  Patient Instructions   I recommended consultation with orthopedic hand surgeon after completion of CT scan of wrist to determine if there is incomplete healing of scaphoid fracture and also for opinion safety of pursuing USG 1st CMC OA CSI in setting of residual orthopedic hardware in wrist. If plan is to continue with conservative treatment then would consider USG CSI 1st CMC OA vs 1st DC.       __________________________________________________________________________    HISTORY OF PRESENT ILLNESS:    C/o chronic intermittent wrist pain worsening. PMH scaphoid fracture years ago with hardware placed. Points to radial side of wrist as source of intermittent moderate pain exacerbated by work typing as . Denies recent injury      Review of Systems      Following history reviewed and update:    No past medical history on file.  Past Surgical History:   Procedure Laterality Date    WRIST  "SURGERY Right      Social History   Social History     Substance and Sexual Activity   Alcohol Use Not on file     Social History     Substance and Sexual Activity   Drug Use Not on file     Social History     Tobacco Use   Smoking Status Former   Smokeless Tobacco Never     No family history on file.  No Known Allergies       Physical Exam  /98 (BP Location: Left arm, Patient Position: Sitting, Cuff Size: Standard)   Ht 5' 6\" (1.676 m)   Wt 107 kg (235 lb)   BMI 37.93 kg/m²         Ortho Exam    Right Wrist  no swelling, erythema, or increased warmth  rom full  +radial snuff box and 1st CMA      Right Hand  no erythema  swelling: n  tenderness: n  rom fingers mcp, pip, dip intact without pain  No digital scissoring or deviation of fingers  no extensor lag  no rotation of fingers  no joint laxity  strenght flexion and extension mcp, pip, dip 5/5  sensation intact  capillary refill intact     RIGHT THUMB:  Erythema: no  Swelling: no  Increased Warmth: no  Tenderness: +1st CMC, 1st DC  ROM: intact flexion, extension  OK sign: normal  Froment Sign: negative  Finklestein's: +  1st CMC Axial Load:  +      __________________________________________________________________________  Procedures                  "

## 2024-05-16 ENCOUNTER — HOSPITAL ENCOUNTER (OUTPATIENT)
Dept: CT IMAGING | Facility: HOSPITAL | Age: 49
Discharge: HOME | End: 2024-05-16
Payer: COMMERCIAL

## 2024-05-16 DIAGNOSIS — S62.021K CLOSED DISPLACED FRACTURE OF MIDDLE THIRD OF SCAPHOID OF RIGHT WRIST WITH NONUNION, SUBSEQUENT ENCOUNTER: ICD-10-CM

## 2024-05-16 PROCEDURE — 73200 CT UPPER EXTREMITY W/O DYE: CPT

## 2024-05-28 ENCOUNTER — OFFICE VISIT (OUTPATIENT)
Dept: OBGYN CLINIC | Facility: CLINIC | Age: 49
End: 2024-05-28
Payer: COMMERCIAL

## 2024-05-28 VITALS
HEIGHT: 66 IN | DIASTOLIC BLOOD PRESSURE: 88 MMHG | BODY MASS INDEX: 37.77 KG/M2 | HEART RATE: 87 BPM | WEIGHT: 235 LBS | SYSTOLIC BLOOD PRESSURE: 130 MMHG

## 2024-05-28 DIAGNOSIS — M65.4 DE QUERVAIN'S TENOSYNOVITIS, RIGHT: Primary | ICD-10-CM

## 2024-05-28 PROCEDURE — 99213 OFFICE O/P EST LOW 20 MIN: CPT | Performed by: ORTHOPAEDIC SURGERY

## 2024-05-28 PROCEDURE — 20550 NJX 1 TENDON SHEATH/LIGAMENT: CPT | Performed by: ORTHOPAEDIC SURGERY

## 2024-05-28 RX ORDER — BETAMETHASONE SODIUM PHOSPHATE AND BETAMETHASONE ACETATE 3; 3 MG/ML; MG/ML
6 INJECTION, SUSPENSION INTRA-ARTICULAR; INTRALESIONAL; INTRAMUSCULAR; SOFT TISSUE
Status: COMPLETED | OUTPATIENT
Start: 2024-05-28 | End: 2024-05-28

## 2024-05-28 RX ORDER — LIDOCAINE HYDROCHLORIDE 10 MG/ML
1 INJECTION, SOLUTION INFILTRATION; PERINEURAL
Status: COMPLETED | OUTPATIENT
Start: 2024-05-28 | End: 2024-05-28

## 2024-05-28 RX ADMIN — BETAMETHASONE SODIUM PHOSPHATE AND BETAMETHASONE ACETATE 6 MG: 3; 3 INJECTION, SUSPENSION INTRA-ARTICULAR; INTRALESIONAL; INTRAMUSCULAR; SOFT TISSUE at 13:30

## 2024-05-28 RX ADMIN — LIDOCAINE HYDROCHLORIDE 1 ML: 10 INJECTION, SOLUTION INFILTRATION; PERINEURAL at 13:30

## 2024-05-28 NOTE — PROGRESS NOTES
Assessment/Plan:  1. De Quervain's tenosynovitis, right            Subjective history, physical examination performed, diagnostic imaging reviewed at today's visit  Diagnosis was discussed De Quervains tenosynovitis of right thumb.  I explained to the patient that while she has a past surgical history of open reduction internal fixation of a scaphoid fracture which on x-rays and CT scan do not show a bony union, she would have experienced pain closer to the time of injury which was 20 years ago.  She has had no pain in the wrist into the past 4-5 months.  Pain is located along the dorsal radial aspect of the wrist and occurs when she extends the thumb.  This is consistent with de Quervain's tendinitis.  Treatment options were discussed which included injection, splinting, activity modification  Risks, benefits, and realistic expectations for treatment options were discussed.    The patient was given an opportunity to ask questions.  Questions were answered to the patient's satisfaction.    Through shared decision making, the patient decided to move forward with  right deQuervain CSI. Patient tolerated this well. Post injection instructions were provided.       cc: right thumb pain    Subjective:   Lory Boo is a  49 y.o. female who presents with right thumb pain. She states she has been having pain for 4-5 months and denies ALTON and traumas. She states she has PMHx of scaphoid fracture with a screw place over 20 years ago. She states that she has trouble gripping and twisting activities.  She experiences pain when she lifts her thumb.  Pain is located along the dorsal radial aspect of the right wrist.  She has seen Dr. Du and had a CT scan done. She has not had any injections done in the past.        Review of Systems   Constitutional:  Positive for activity change. Negative for chills, fever and unexpected weight change.   HENT:  Negative for hearing loss, nosebleeds and sore throat.    Eyes:   Negative for pain, redness and visual disturbance.   Respiratory:  Negative for cough, shortness of breath and wheezing.    Cardiovascular:  Negative for chest pain, palpitations and leg swelling.   Gastrointestinal:  Negative for abdominal pain, nausea and vomiting.   Endocrine: Negative for polydipsia and polyuria.   Genitourinary:  Negative for dysuria and hematuria.   Musculoskeletal:         See HPI   Skin:  Negative for rash and wound.   Neurological:  Negative for dizziness, numbness and headaches.   Psychiatric/Behavioral:  Negative for decreased concentration and suicidal ideas. The patient is not nervous/anxious.          History reviewed. No pertinent past medical history.    Past Surgical History:   Procedure Laterality Date    WRIST SURGERY Right        History reviewed. No pertinent family history.    Social History     Occupational History    Not on file   Tobacco Use    Smoking status: Former    Smokeless tobacco: Never   Vaping Use    Vaping status: Never Used   Substance and Sexual Activity    Alcohol use: Not on file    Drug use: Not on file    Sexual activity: Not on file         Current Outpatient Medications:     hydrOXYzine HCL (ATARAX) 10 mg tablet, Take 10 mg by mouth 3 (three) times a day as needed, Disp: , Rfl:     sertraline (ZOLOFT) 100 mg tablet, Take 100 mg by mouth daily, Disp: , Rfl:     Skyrizi Pen 150 MG/ML SOAJ, , Disp: , Rfl:     spironolactone (ALDACTONE) 100 mg tablet, Take 100 mg by mouth daily With food, Disp: , Rfl:     triamcinolone (KENALOG) 0.1 % cream, , Disp: , Rfl:     naproxen (NAPROSYN) 500 mg tablet, TAKE 1 TABLET (500 MG TOTAL) BY MOUTH TWICE A DAY AS NEEDED FOR MODERATE PAIN (Patient not taking: Reported on 4/27/2024), Disp: 40 tablet, Rfl: 0    No Known Allergies    Objective:  Vitals:    05/28/24 1328   BP: 130/88   Pulse: 87       The patient was awake, alert, and oriented to person, place, and time.  No acute distress.  Normocephalic.  EOMI.  Mucous membranes  moist.  Normal respiratory effort.    Examination of the affected extremity was compared to the unaffected extremity.  Skin was intact.  There are scars consistent with her past surgical history.  Full active range of motion of the elbows, forearms, wrists, and fingers.  5/5 wrist flexor/extensors and .     There was swelling and tenderness along the tendons of the first dorsal wrist compartment. Resisted thumb extension produced discomfort. There was a break in strength. No swelling or ecchymosis.  No deformity.  Hand and fingers were warm and well-perfused.  Capillary refill was brisk.      No TTP anatomic snuffbox.    Imaging/Diagnostic Studies:    I reviewed imaging studies dated 4/17/24 and 5/16/24 which included x-rays and CT scan of the right wrist .  These images studies demonstrated postoperative changes consistent with PSH.  Fibrous nonunion of scaphoid fracture.      Hand/upper extremity injection: R extensor compartment 1  Universal Protocol:  Consent: Verbal consent obtained.  Risks and benefits: risks, benefits and alternatives were discussed  Consent given by: patient  Patient understanding: patient states understanding of the procedure being performed  Site marked: the operative site was marked  Patient identity confirmed: verbally with patient  Supporting Documentation  Indications: diagnostic   Procedure Details  Condition:de Quervain's tenosynovitis Site: R extensor compartment 1   Needle size: 25 G  Ultrasound guidance: no  Approach: volar  Medications administered: 1 mL lidocaine 1 %; 6 mg betamethasone acetate-betamethasone sodium phosphate 6 (3-3) mg/mL  Patient tolerance: patient tolerated the procedure well with no immediate complications  Dressing:  Sterile dressing applied             This document was created using speech voice recognition software.   Grammatical errors, random word insertions, pronoun errors, and incomplete sentences are an occasional consequence of this system due  to software limitations, ambient noise, and hardware issues.   Any formal questions or concerns about content, text, or information contained within the body of this dictation should be directly addressed to the provider for clarification.      Scribe Attestation      I,:  Dexter Erazo am acting as a scribe while in the presence of the attending physician.:       I,:  Flor Mendoza MD personally performed the services described in this documentation    as scribed in my presence.:

## 2024-07-24 ENCOUNTER — OFFICE VISIT (OUTPATIENT)
Dept: OBGYN CLINIC | Facility: CLINIC | Age: 49
End: 2024-07-24
Payer: COMMERCIAL

## 2024-07-24 VITALS
DIASTOLIC BLOOD PRESSURE: 78 MMHG | HEIGHT: 66 IN | WEIGHT: 251 LBS | SYSTOLIC BLOOD PRESSURE: 122 MMHG | BODY MASS INDEX: 40.34 KG/M2

## 2024-07-24 DIAGNOSIS — M18.11 PRIMARY OSTEOARTHRITIS OF FIRST CARPOMETACARPAL JOINT OF RIGHT HAND: Primary | ICD-10-CM

## 2024-07-24 PROCEDURE — 20600 DRAIN/INJ JOINT/BURSA W/O US: CPT | Performed by: ORTHOPAEDIC SURGERY

## 2024-07-24 PROCEDURE — 99213 OFFICE O/P EST LOW 20 MIN: CPT | Performed by: ORTHOPAEDIC SURGERY

## 2024-07-24 NOTE — PROGRESS NOTES
Assessment/Plan:  1. Primary osteoarthritis of first carpometacarpal joint of right hand            Subjective history, physical examination performed, diagnostic imaging reviewed at today's visit  Diagnosis was discussed  Treatment options were discussed which included nonoperative and operative treatment.    Risks, benefits, and realistic expectations for treatment options were discussed.    The patient was given an opportunity to ask questions.  Questions were answered to the patient's satisfaction.    Through shared decision making, the patient decided to move forward with  CSI and Comfort Cool splint          cc: Follow-up for my hand    Subjective:   Lory Boo is a  49 y.o. female who presents for follow-up of right de Quervain's tendinitis and right thumb pain. She states she has had pain for 5/6 months and denies ALTON and traumas. She states she has PMHx of scaphoid fracture with a screw place over 20 years ago. She states that she has trouble gripping and twisting activities.  She experiences pain when she lifts her thumb.  Pain is located along the dorsal radial aspect of the right wrist.  She has seen Dr. Du and had a CT scan done.     At the initial office visit, I diagnosed her with de Quervain's tendinitis.  An injection was performed and she experienced relief of the symptoms associated with Dequervain's tendinitis.  Today she reported that most of the pain is around the thumb CMC joint.  She experiences pain with gripping, grasping, and pinching.          History reviewed. No pertinent past medical history.    Past Surgical History:   Procedure Laterality Date    WRIST SURGERY Right        History reviewed. No pertinent family history.    Social History     Occupational History    Not on file   Tobacco Use    Smoking status: Former    Smokeless tobacco: Never   Vaping Use    Vaping status: Never Used   Substance and Sexual Activity    Alcohol use: Not on file    Drug use: Not on file     Sexual activity: Not on file         Current Outpatient Medications:     hydrOXYzine HCL (ATARAX) 10 mg tablet, Take 10 mg by mouth 3 (three) times a day as needed, Disp: , Rfl:     naproxen (NAPROSYN) 500 mg tablet, TAKE 1 TABLET (500 MG TOTAL) BY MOUTH TWICE A DAY AS NEEDED FOR MODERATE PAIN (Patient not taking: Reported on 4/27/2024), Disp: 40 tablet, Rfl: 0    sertraline (ZOLOFT) 100 mg tablet, Take 100 mg by mouth daily, Disp: , Rfl:     Skyrizi Pen 150 MG/ML SOAJ, , Disp: , Rfl:     spironolactone (ALDACTONE) 100 mg tablet, Take 100 mg by mouth daily With food, Disp: , Rfl:     triamcinolone (KENALOG) 0.1 % cream, , Disp: , Rfl:     No Known Allergies    Objective:  Vitals:    07/24/24 1523   BP: 122/78       The patient was awake, alert, and oriented to person, place, and time.  No acute distress.  Normocephalic.  EOMI.  Mucous membranes moist.  Normal respiratory effort.    Examination of the affected extremity was compared to the unaffected extremity.  Skin was intact.  No swelling or ecchymosis.  No deformity.  Hand and fingers were warm and well-perfused.  Capillary refill was brisk.  Full active range of motion of the elbows, forearms, wrists, and fingers.  5/5 elbow flexors/extensors, wrist flexor/extensors, and .       There was no swelling and tenderness along the tendons of the first dorsal wrist compartment. Resisted thumb extension did not produce discomfort. There was not a break in strength.     There was tenderness to palpation at the right thumb CMC joint.  No compensatory MCP joint hyperextension or shoulder sign.  Grind test was positive.    Imaging/Diagnostic Studies:    I reviewed the CT scan dated 5/16/2024 again.  This time I focused on the thumb CMC joint.  There were degenerative changes noted at the thumb CMC joint.    Small joint arthrocentesis: R thumb CMC  [place] Protocol:  Consent: Verbal consent obtained.  Risks and benefits: risks, benefits and alternatives were  discussed  Consent given by: patient  Patient understanding: patient states understanding of the procedure being performed  Site marked: the operative site was marked  Patient identity confirmed: verbally with patient  Supporting Documentation  Indications: pain   Procedure Details  Location: thumb - R thumb CMC  Needle size: 25 G  Ultrasound guidance: no  Approach: radial    Patient tolerance: patient tolerated the procedure well with no immediate complications  Dressing:  Sterile dressing applied             This document was created using speech voice recognition software.   Grammatical errors, random word insertions, pronoun errors, and incomplete sentences are an occasional consequence of this system due to software limitations, ambient noise, and hardware issues.   Any formal questions or concerns about content, text, or information contained within the body of this dictation should be directly addressed to the provider for clarification.

## 2025-01-15 ENCOUNTER — TELEPHONE (OUTPATIENT)
Age: 50
End: 2025-01-15

## 2025-05-14 ENCOUNTER — TELEPHONE (OUTPATIENT)
Age: 50
End: 2025-05-14

## 2025-05-23 ENCOUNTER — OFFICE VISIT (OUTPATIENT)
Dept: OBGYN CLINIC | Facility: CLINIC | Age: 50
End: 2025-05-23

## 2025-05-23 VITALS — BODY MASS INDEX: 40.34 KG/M2 | HEIGHT: 66 IN | WEIGHT: 251 LBS

## 2025-05-23 DIAGNOSIS — M18.11 PRIMARY OSTEOARTHRITIS OF FIRST CARPOMETACARPAL JOINT OF RIGHT HAND: Primary | ICD-10-CM

## 2025-05-23 RX ORDER — LIDOCAINE HYDROCHLORIDE 10 MG/ML
1 INJECTION, SOLUTION INFILTRATION; PERINEURAL
Status: COMPLETED | OUTPATIENT
Start: 2025-05-23 | End: 2025-05-23

## 2025-05-23 RX ORDER — BETAMETHASONE SODIUM PHOSPHATE AND BETAMETHASONE ACETATE 3; 3 MG/ML; MG/ML
6 INJECTION, SUSPENSION INTRA-ARTICULAR; INTRALESIONAL; INTRAMUSCULAR; SOFT TISSUE
Status: COMPLETED | OUTPATIENT
Start: 2025-05-23 | End: 2025-05-23

## 2025-05-23 RX ADMIN — LIDOCAINE HYDROCHLORIDE 1 ML: 10 INJECTION, SOLUTION INFILTRATION; PERINEURAL at 11:45

## 2025-05-23 RX ADMIN — BETAMETHASONE SODIUM PHOSPHATE AND BETAMETHASONE ACETATE 6 MG: 3; 3 INJECTION, SUSPENSION INTRA-ARTICULAR; INTRALESIONAL; INTRAMUSCULAR; SOFT TISSUE at 11:45

## 2025-05-23 NOTE — PROGRESS NOTES
ORTHOPAEDIC HAND, WRIST, AND ELBOW OFFICE  VISIT     Name: Lory Boo      : 1975      MRN: [id number]  Encounter Provider: Flor Mendoza MD  Encounter Date: 2025   Encounter department: [facility]  :  Assessment & Plan  Primary osteoarthritis of first carpometacarpal joint of right hand  Subjective history, clinical exam, and diagnostic studies reviewed  Diagnosis discussed  Treatment options discussed which include nonoperative and operative management.  We discussed use of splinting, therapy, activity modification, use of NSAIDs (oral and topical), and injections.  We discussed risks and benefits of each as well as expected reasonable outcomes.  Surgery can be considered following unsuccessful treatment with nonoperative management.  Paraffin wax baths/dips can be helpful in alleviate arthritis pain.  Paraffin wax dips can also be helpful for joint pain.  Instructions for paraffin wax dips include: Warm wax, dipped hand and wax 3 times, placed plastic bag over hand for 15-20 minutes, then peel the wax from the hand and place the wax back into the paraffin wax container if you are the only one who uses the paraffin wax bath.  Arnica gel/cream is a homeopathic anti-inflammatory gel/cream that can be use on the affected area.  The patient was given the opportunity to ask questions.  Questions were answered to the patient's satisfaction.  The patient decided to move forward with CSI of right thumb CMC joint via shared decision making.  Follow up PRN   Orders:    Small joint arthrocentesis          General Discussions:  CMC Arthritis: The anatomy and physiology of carpometacarpal joint arthritis was discussed with the patient today in the office.  Deterioration of the articular cartilage eventually leads to hypermobility at the thumb CMC joint, resulting in joint subluxation, osteophyte formation, cystic changes within the trapezium and base of  "the first metacarpal, as well as subchondral sclerosis.  Eventually, pain, limited mobility, and compensatory hyperextension at the metacarpophalangeal joint may develop.  While normal activity and usage of the thumb joint may provide a painful experience to the patient, this typically does not result in damage to the thumb or hand.  Treatment options include resting thumb spica splints to decreased joint edema, pain, and inflammation.  Therapy exercises to strengthen the thenar musculature may relieve pain, but do not alter the overall continued development of osteoarthritis.  Oral medications, topical medications, dietary changes, and corticosteroid injections may decrease pain and increase overall function.  Eventually, approximately 10-20% of patients may require surgical intervention.           History of Present Illness   HPI  Chief Complaint   Patient presents with    Right Wrist - Follow-up, Pain       Lory Boo is a 50 y.o. female who presents for follow up for right thumb CMC arthritis. She states she got good symptomatic relief from her previous cortisone injection on 7/24/24. She is here today for repeat cortisone injection.    Hand dominance: right    REVIEW OF SYSTEMS:  General: no fever, no chills  HEENT:  No loss of hearing or eyesight problems  Eyes:  No red eyes  Respiratory:  No coughing, shortness of breath or wheezing  Cardiovascular:  No chest pain, no palpitations  GI:  Abdomen soft nontender, denies nausea  Endocrine:  No muscle weakness, no frequent urination, no excessive thirst  Urinary:  No dysuria, no incontinence  Musculoskeletal: see HPI and PE  SKIN:  No skin rash, no dry skin  Neurological:  No headaches, no confusion  Psychiatric:  No suicide thoughts, no anxiety, no depression  Review of all other systems is negative    Objective   Ht 5' 6\" (1.676 m)   Wt 114 kg (251 lb)   BMI 40.51 kg/m²      General: well developed and well nourished, alert, oriented times 3, and " appears comfortable  Psychiatric: Normal  HEENT: Trachea Midline, No torticollis  Cardiovascular: No discernable arrhythmia  Pulmonary: No wheezing or stridor  Abdomen: No rebound or guarding  Extremities: No peripheral edema  Skin: No masses, erythema, lacerations, fluctation, ulcerations  Neurovascular: Sensation Intact to the Median, Ulnar, Radial Nerve, Motor Intact to the Median, Ulnar, Radial Nerve, and Pulses Intact    Musculoskeletal exam:  Right hand/wrist:  Skin was intact.  Scar cw PSH. No swelling or ecchymosis.  No deformity.  Hand and fingers were warm and well-perfused.  Capillary refill was brisk.  Full active range of motion of the elbows, forearms, wrists, and fingers.      Negative shoulder sign. Negative thumb metacarpal adduction.  Negative compensatory MCP joint hyperextension. There was tenderness at the thumb CMC joint. Grind test was Positive. Axial loading of the thumb produced Pain.     NTTP along tendons of 1st DWC. No pain with resisted thumb extension.    STUDIES REVIEWED:  No Studies to review      PROCEDURES PERFORMED:  Small joint arthrocentesis: R thumb CMC    Performed by: Flor Mendoza MD  Authorized by: Flor Mendoza MD    Universal Protocol:  Consent: Verbal consent obtained  Risks and benefits: risks, benefits and alternatives were discussed  Consent given by: patient  Patient understanding: patient states understanding of the procedure being performed  Patient identity confirmed: verbally with patient  Supporting Documentation  Indications: pain   Procedure Details  Location: thumb - R thumb CMC  Needle size: 25 G  Ultrasound guidance: no  Approach: dorsal  Medications administered: 1 mL lidocaine 1 %; 6 mg betamethasone acetate-betamethasone sodium phosphate 6 (3-3) mg/mL    Patient tolerance: patient tolerated the procedure well with no immediate complications  Dressing:  Sterile dressing applied        -     Scribe Attestation      I,:  Luis Fregoso  am acting as a scribe while in the presence of the attending physician.:       I,:  Flor Mendoza MD personally performed the services described in this documentation    as scribed in my presence.:

## 2025-05-23 NOTE — ASSESSMENT & PLAN NOTE
Subjective history, clinical exam, and diagnostic studies reviewed  Diagnosis discussed  Treatment options discussed which include nonoperative and operative management.  We discussed use of splinting, therapy, activity modification, use of NSAIDs (oral and topical), and injections.  We discussed risks and benefits of each as well as expected reasonable outcomes.  Surgery can be considered following unsuccessful treatment with nonoperative management.  Paraffin wax baths/dips can be helpful in alleviate arthritis pain.  Paraffin wax dips can also be helpful for joint pain.  Instructions for paraffin wax dips include: Warm wax, dipped hand and wax 3 times, placed plastic bag over hand for 15-20 minutes, then peel the wax from the hand and place the wax back into the paraffin wax container if you are the only one who uses the paraffin wax bath.  Arnica gel/cream is a homeopathic anti-inflammatory gel/cream that can be use on the affected area.  The patient was given the opportunity to ask questions.  Questions were answered to the patient's satisfaction.  The patient decided to move forward with CSI of right thumb CMC joint via shared decision making.  Follow up PRN   Orders:    Small joint arthrocentesis

## 2025-06-18 ENCOUNTER — OFFICE VISIT (OUTPATIENT)
Dept: PODIATRY | Facility: CLINIC | Age: 50
End: 2025-06-18
Payer: COMMERCIAL

## 2025-06-18 ENCOUNTER — APPOINTMENT (OUTPATIENT)
Dept: RADIOLOGY | Facility: CLINIC | Age: 50
End: 2025-06-18
Attending: PODIATRIST
Payer: COMMERCIAL

## 2025-06-18 VITALS — HEIGHT: 66 IN | BODY MASS INDEX: 40.34 KG/M2 | WEIGHT: 251 LBS

## 2025-06-18 DIAGNOSIS — M19.071 PRIMARY OSTEOARTHRITIS OF RIGHT FOOT: Primary | ICD-10-CM

## 2025-06-18 DIAGNOSIS — M25.571 PAIN IN JOINT OF RIGHT FOOT: ICD-10-CM

## 2025-06-18 PROCEDURE — 73630 X-RAY EXAM OF FOOT: CPT

## 2025-06-18 PROCEDURE — 99213 OFFICE O/P EST LOW 20 MIN: CPT | Performed by: PODIATRIST

## 2025-06-18 PROCEDURE — 20600 DRAIN/INJ JOINT/BURSA W/O US: CPT | Performed by: PODIATRIST

## 2025-06-18 RX ORDER — LIDOCAINE HYDROCHLORIDE 10 MG/ML
1 INJECTION, SOLUTION INFILTRATION; PERINEURAL
Status: SHIPPED | OUTPATIENT
Start: 2025-06-18

## 2025-06-18 RX ORDER — TRIAMCINOLONE ACETONIDE 40 MG/ML
10 INJECTION, SUSPENSION INTRA-ARTICULAR; INTRAMUSCULAR
Status: SHIPPED | OUTPATIENT
Start: 2025-06-18

## 2025-06-18 RX ADMIN — TRIAMCINOLONE ACETONIDE 10 MG: 40 INJECTION, SUSPENSION INTRA-ARTICULAR; INTRAMUSCULAR at 09:15

## 2025-06-18 RX ADMIN — LIDOCAINE HYDROCHLORIDE 1 ML: 10 INJECTION, SOLUTION INFILTRATION; PERINEURAL at 09:15

## 2025-06-18 NOTE — PROGRESS NOTES
"               PATIENT:  Lory Boo  1975         ASSESSMENT:     1. Primary osteoarthritis of right foot  Small joint arthrocentesis: R intertarsal      2. Pain in joint of right foot  XR foot 3+ vw right                  PLAN:  1. Reviewed medical records.  Patient was counseled and educated on the condition and the diagnosis.    2. X-ray was obtained and reviewed with her.  No acute osseous injury.  The diagnosis, treatment options and prognosis were discussed with the patient.    3.  Her pain is most likely due to 1st TMTJ arthrosis.   4. Treatment options were discussed and the patient wished to proceed with an injection.  Injection was given as in the procedure.  5.  Instructed supportive care, home exercise, icing, and proper footwear/ arch support.    6.  Patient will return in 6 weeks for re-evaluation.     Small joint arthrocentesis: R intertarsal    Performed by: Mao Smith DPM  Authorized by: Mao Smith DPM    Universal Protocol:  Consent: Verbal consent obtained  Risks and benefits: risks, benefits and alternatives were discussed  Consent given by: patient  Time out: Immediately prior to procedure a \"time out\" was called to verify the correct patient, procedure, equipment, support staff and site/side marked as required.  Timeout called at: 6/18/2025 9:46 AM.  Patient understanding: patient states understanding of the procedure being performed  Site marked: the operative site was marked  Patient identity confirmed: verbally with patient  Supporting Documentation  Indications: pain   Procedure Details  Location: foot - R intertarsal (1st TMTJ)  Preparation: Patient was prepped and draped in the usual sterile fashion  Needle size: 25 G  Ultrasound guidance: no  Approach: medial  Medications administered: 10 mg triamcinolone acetonide 40 mg/mL; 1 mL lidocaine 1 %    Patient tolerance: patient tolerated the procedure well with no immediate complications            Subjective:       HPI  The patient " presents with chief complaint of right foot pain.  She has pain in right midfoot in the past year.  Increased pain in the last few months.  She has significant post-static dyskinesia.  Denied any injury or acute swelling.  Increased pain after being on her feet as well.  No associated numbness or paresthesia.  No significant weakness.         The following portions of the patient's history were reviewed and updated as appropriate: allergies, current medications, past family history, past medical history, past social history, past surgical history and problem list.  All pertinent labs and images were reviewed.      Past Medical History  No past medical history on file.    Past Surgical History  Past Surgical History:   Procedure Laterality Date    WRIST SURGERY Right         Allergies:  Patient has no known allergies.    Medications:  Current Outpatient Medications   Medication Sig Dispense Refill    hydrOXYzine HCL (ATARAX) 10 mg tablet Take 10 mg by mouth as needed in the morning and 10 mg as needed at noon and 10 mg as needed in the evening.      sertraline (ZOLOFT) 100 mg tablet Take 100 mg by mouth in the morning.      Skyrizi Pen 150 MG/ML SOAJ       spironolactone (ALDACTONE) 100 mg tablet Take 100 mg by mouth in the morning. With food.      triamcinolone (KENALOG) 0.1 % cream       naproxen (NAPROSYN) 500 mg tablet TAKE 1 TABLET (500 MG TOTAL) BY MOUTH TWICE A DAY AS NEEDED FOR MODERATE PAIN (Patient not taking: Reported on 4/27/2024) 40 tablet 0     No current facility-administered medications for this visit.       Social History:  Social History     Socioeconomic History    Marital status: /Civil Union   Tobacco Use    Smoking status: Former    Smokeless tobacco: Never   Vaping Use    Vaping status: Never Used          Review of Systems   Constitutional:  Negative for appetite change, chills and fever.   Respiratory:  Negative for cough and shortness of breath.    Cardiovascular:  Negative for chest  "pain.   Gastrointestinal:  Negative for nausea and vomiting.   Allergic/Immunologic: Negative for immunocompromised state.   Neurological:  Negative for weakness and numbness.   Psychiatric/Behavioral:  Negative for confusion.          Objective:      Ht 5' 6\" (1.676 m)   Wt 114 kg (251 lb)   BMI 40.51 kg/m²          Physical Exam  Vitals reviewed.   Constitutional:       General: She is not in acute distress.     Appearance: She is obese. She is not toxic-appearing.     Cardiovascular:      Rate and Rhythm: Normal rate and regular rhythm.      Pulses: Normal pulses.           Dorsalis pedis pulses are 2+ on the right side and 2+ on the left side.        Posterior tibial pulses are 2+ on the right side and 2+ on the left side.   Pulmonary:      Effort: Pulmonary effort is normal. No respiratory distress.     Musculoskeletal:         General: Tenderness present. No swelling or signs of injury.      Right lower leg: No edema.      Left lower leg: No edema.      Right foot: No foot drop.      Left foot: No foot drop.      Comments: Focal pain in medial 1st TMTJ.  No significant pain along right TAT.  Normal function of right TAT without pain. No acute swelling.  Ankle equinus noted.     Skin:     General: Skin is warm.      Capillary Refill: Capillary refill takes less than 2 seconds.      Coloration: Skin is not cyanotic or mottled.      Findings: No abscess, ecchymosis, erythema, rash or wound.      Nails: There is no clubbing.     Neurological:      General: No focal deficit present.      Mental Status: She is alert and oriented to person, place, and time.      Cranial Nerves: No cranial nerve deficit.      Sensory: No sensory deficit.      Motor: No weakness.      Coordination: Coordination normal.     Psychiatric:         Mood and Affect: Mood normal.         Behavior: Behavior normal.         Thought Content: Thought content normal.         Judgment: Judgment normal.         "

## 2025-07-24 PROBLEM — Z97.5 PRESENCE OF (INTRAUTERINE) CONTRACEPTIVE DEVICE: Status: ACTIVE | Noted: 2025-07-24

## 2025-07-24 PROBLEM — F41.9 ANXIETY DISORDER, UNSPECIFIED: Status: ACTIVE | Noted: 2025-07-24

## 2025-07-24 PROBLEM — L40.9 PSORIASIS, UNSPECIFIED: Status: ACTIVE | Noted: 2025-07-24

## 2025-07-24 PROBLEM — K63.5 POLYP OF COLON: Status: ACTIVE | Noted: 2025-07-24

## 2025-07-31 ENCOUNTER — VBI (OUTPATIENT)
Dept: ADMINISTRATIVE | Facility: OTHER | Age: 50
End: 2025-07-31

## 2025-08-06 ENCOUNTER — OFFICE VISIT (OUTPATIENT)
Age: 50
End: 2025-08-06
Payer: COMMERCIAL

## 2025-08-06 ENCOUNTER — TELEPHONE (OUTPATIENT)
Dept: ADMINISTRATIVE | Facility: OTHER | Age: 50
End: 2025-08-06

## 2025-08-06 VITALS
BODY MASS INDEX: 40.32 KG/M2 | SYSTOLIC BLOOD PRESSURE: 128 MMHG | OXYGEN SATURATION: 97 % | HEART RATE: 85 BPM | WEIGHT: 250.9 LBS | TEMPERATURE: 99.3 F | HEIGHT: 66 IN | DIASTOLIC BLOOD PRESSURE: 98 MMHG

## 2025-08-06 DIAGNOSIS — Z13.1 SCREENING FOR DIABETES MELLITUS: ICD-10-CM

## 2025-08-06 DIAGNOSIS — E78.1 HYPERTRIGLYCERIDEMIA: Primary | ICD-10-CM

## 2025-08-06 DIAGNOSIS — Z12.31 ENCOUNTER FOR SCREENING MAMMOGRAM FOR BREAST CANCER: ICD-10-CM

## 2025-08-06 DIAGNOSIS — Z00.00 ANNUAL PHYSICAL EXAM: ICD-10-CM

## 2025-08-06 DIAGNOSIS — R63.5 WEIGHT GAIN: ICD-10-CM

## 2025-08-06 DIAGNOSIS — Z23 ENCOUNTER FOR IMMUNIZATION: ICD-10-CM

## 2025-08-06 DIAGNOSIS — L40.9 PSORIASIS, UNSPECIFIED: ICD-10-CM

## 2025-08-06 DIAGNOSIS — K63.5 POLYP OF COLON, UNSPECIFIED PART OF COLON, UNSPECIFIED TYPE: ICD-10-CM

## 2025-08-06 DIAGNOSIS — M18.11 PRIMARY OSTEOARTHRITIS OF FIRST CARPOMETACARPAL JOINT OF RIGHT HAND: ICD-10-CM

## 2025-08-06 DIAGNOSIS — E55.9 VITAMIN D DEFICIENCY: ICD-10-CM

## 2025-08-06 PROBLEM — S89.91XA INJURY OF RIGHT KNEE: Status: RESOLVED | Noted: 2023-08-04 | Resolved: 2025-08-06

## 2025-08-06 PROCEDURE — 90471 IMMUNIZATION ADMIN: CPT

## 2025-08-06 PROCEDURE — 99396 PREV VISIT EST AGE 40-64: CPT | Performed by: PHYSICIAN ASSISTANT

## 2025-08-06 PROCEDURE — 99213 OFFICE O/P EST LOW 20 MIN: CPT | Performed by: PHYSICIAN ASSISTANT

## 2025-08-06 PROCEDURE — 90715 TDAP VACCINE 7 YRS/> IM: CPT
